# Patient Record
Sex: FEMALE | Race: ASIAN | NOT HISPANIC OR LATINO | Employment: FULL TIME | URBAN - METROPOLITAN AREA
[De-identification: names, ages, dates, MRNs, and addresses within clinical notes are randomized per-mention and may not be internally consistent; named-entity substitution may affect disease eponyms.]

---

## 2017-07-26 ENCOUNTER — TELEPHONE (OUTPATIENT)
Dept: PEDIATRICS | Facility: CLINIC | Age: 32
End: 2017-07-26

## 2017-07-26 NOTE — TELEPHONE ENCOUNTER
Please have patient and  come at least 20 minutes early as they are new patients and are sharing an . Please also have the  come 20 minutes early.

## 2017-07-27 ENCOUNTER — TELEPHONE (OUTPATIENT)
Dept: PEDIATRICS | Facility: CLINIC | Age: 32
End: 2017-07-27

## 2017-07-27 ENCOUNTER — OFFICE VISIT (OUTPATIENT)
Dept: PEDIATRICS | Facility: CLINIC | Age: 32
End: 2017-07-27
Payer: COMMERCIAL

## 2017-07-27 VITALS
BODY MASS INDEX: 29.38 KG/M2 | HEIGHT: 61 IN | OXYGEN SATURATION: 97 % | WEIGHT: 155.6 LBS | TEMPERATURE: 97.2 F | HEART RATE: 68 BPM | SYSTOLIC BLOOD PRESSURE: 104 MMHG | DIASTOLIC BLOOD PRESSURE: 64 MMHG

## 2017-07-27 DIAGNOSIS — R10.84 ABDOMINAL PAIN, GENERALIZED: ICD-10-CM

## 2017-07-27 DIAGNOSIS — Z78.9 INEFFECTIVE HEALTH MAINTENANCE: Primary | ICD-10-CM

## 2017-07-27 DIAGNOSIS — E28.2 PCOS (POLYCYSTIC OVARIAN SYNDROME): ICD-10-CM

## 2017-07-27 DIAGNOSIS — N97.9 FEMALE INFERTILITY: ICD-10-CM

## 2017-07-27 LAB
ALBUMIN UR-MCNC: NEGATIVE MG/DL
APPEARANCE UR: CLEAR
BETA HCG QUAL IFA URINE: NEGATIVE
BILIRUB UR QL STRIP: NEGATIVE
CHOLEST SERPL-MCNC: 198 MG/DL
COLOR UR AUTO: YELLOW
GLUCOSE SERPL-MCNC: 92 MG/DL (ref 70–99)
GLUCOSE UR STRIP-MCNC: NEGATIVE MG/DL
HDLC SERPL-MCNC: 32 MG/DL
HGB UR QL STRIP: NEGATIVE
KETONES UR STRIP-MCNC: NEGATIVE MG/DL
LDLC SERPL CALC-MCNC: 136 MG/DL
LEUKOCYTE ESTERASE UR QL STRIP: NEGATIVE
NITRATE UR QL: NEGATIVE
NONHDLC SERPL-MCNC: 166 MG/DL
PH UR STRIP: 6 PH (ref 5–7)
SP GR UR STRIP: 1.01 (ref 1–1.03)
TRIGL SERPL-MCNC: 149 MG/DL
TSH SERPL DL<=0.005 MIU/L-ACNC: 3.12 MU/L (ref 0.4–4)
URN SPEC COLLECT METH UR: NORMAL
UROBILINOGEN UR STRIP-ACNC: 0.2 EU/DL (ref 0.2–1)

## 2017-07-27 PROCEDURE — 84703 CHORIONIC GONADOTROPIN ASSAY: CPT | Performed by: NURSE PRACTITIONER

## 2017-07-27 PROCEDURE — 99385 PREV VISIT NEW AGE 18-39: CPT | Performed by: NURSE PRACTITIONER

## 2017-07-27 PROCEDURE — 36415 COLL VENOUS BLD VENIPUNCTURE: CPT | Performed by: NURSE PRACTITIONER

## 2017-07-27 PROCEDURE — 80061 LIPID PANEL: CPT | Performed by: NURSE PRACTITIONER

## 2017-07-27 PROCEDURE — 99213 OFFICE O/P EST LOW 20 MIN: CPT | Mod: 25 | Performed by: NURSE PRACTITIONER

## 2017-07-27 PROCEDURE — 84443 ASSAY THYROID STIM HORMONE: CPT | Performed by: NURSE PRACTITIONER

## 2017-07-27 PROCEDURE — 81003 URINALYSIS AUTO W/O SCOPE: CPT | Performed by: NURSE PRACTITIONER

## 2017-07-27 PROCEDURE — 82947 ASSAY GLUCOSE BLOOD QUANT: CPT | Performed by: NURSE PRACTITIONER

## 2017-07-27 RX ORDER — PRENATAL VIT/IRON FUM/FOLIC AC 27MG-0.8MG
1 TABLET ORAL DAILY
Qty: 100 TABLET | Refills: 3 | Status: SHIPPED | OUTPATIENT
Start: 2017-07-27 | End: 2023-02-13

## 2017-07-27 NOTE — TELEPHONE ENCOUNTER
Patient states that she needs a prescription for her prenatal vitamin in order for it to be covered by insurance.  Please send script to [facility] in [place] & call patient @ home with any questions.    Thanks,    Torri Major

## 2017-07-27 NOTE — MR AVS SNAPSHOT
After Visit Summary   7/27/2017    Isaias Thompson    MRN: [id number]           Patient Information     Date Of Birth          1985        Visit Information        Provider Department      7/27/2017 9:45 AM Edilma Pinzon APRN CNP; LANGUAGE [facility]        Today's Diagnoses     Ineffective health maintenance    -  1    Female infertility        Abdominal pain, generalized          Care Instructions    -Prenatal vitamin  -Try to lose 10 lbs  -See infertility specialist.  -See me in 1 month for pap smear    Preventive Health Recommendations  Female Ages 26 - 39  Yearly exam:   See your health care provider every year in order to    Review health changes.     Discuss preventive care.      Review your medicines if you your doctor has prescribed any.    Until age 30: Get a Pap test every three years (more often if you have had an abnormal result).    After age 30: Talk to your doctor about whether you should have a Pap test every 3 years or have a Pap test with HPV screening every 5 years.   You do not need a Pap test if your uterus was removed (hysterectomy) and you have not had cancer.  You should be tested each year for STDs (sexually transmitted diseases), if you're at risk.   Talk to your provider about how often to have your cholesterol checked.  If you are at risk for diabetes, you should have a diabetes test (fasting glucose).  Shots: Get a flu shot each year. Get a tetanus shot every 10 years.   Nutrition:     Eat at least 5 servings of fruits and vegetables each day.    Eat whole-grain bread, whole-wheat pasta and brown rice instead of white grains and rice.    Talk to your provider about Calcium and Vitamin D.     Lifestyle    Exercise at least 150 minutes a week (30 minutes a day, 5 days of the week). This will help you control your weight and prevent disease.    Limit alcohol to one drink per day.    No smoking.     Wear sunscreen to prevent skin cancer.    See  "your dentist every six months for an exam and cleaning.            Follow-ups after your visit        Additional Services     INFERTILITY/AI REFERRAL       Your provider has referred you to: [facility] [phone]   [email or web address]    Please be aware that coverage of these services is subject to the terms and limitations of your health insurance plan.  Call member services at your health plan with any benefit or coverage questions.      Please bring the following with you to your appointment:    (1) Any X-Rays, CTs or MRIs which have been performed.  Contact the facility where they were done to arrange for  prior to your scheduled appointment.    (2) List of current medications   (3) This referral request   (4) Any documents/labs given to you for this referral                  Who to contact     If you have questions or need follow up information about today's clinic visit or your schedule please contact [facility] directly at [phone].  Normal or non-critical lab and imaging results will be communicated to you by Vidmindhart, letter or phone within 4 business days after the clinic has received the results. If you do not hear from us within 7 days, please contact the clinic through [employer] or phone. If you have a critical or abnormal lab result, we will notify you by phone as soon as possible.  Submit refill requests through Plastio or call your pharmacy and they will forward the refill request to us. Please allow 3 business days for your refill to be completed.          Additional Information About Your Visit        Plastio Information     Plastio lets you send messages to your doctor, view your test results, renew your prescriptions, schedule appointments and more. To sign up, go to [email or web address] . Click on \"Log in\" on the left side of the screen, which will take you to the Welcome page. Then click on \"Sign up Now\" on the right side of the page. " "    You will be asked to enter the access code listed below, as well as some personal information. Please follow the directions to create your username and password.     Your access code is: [email or web address]  Expires: 10/25/2017 11:07 AM     Your access code will  in 90 days. If you need help or a new code, please call your [facility] clinic or [phone].        Care EveryWhere ID     This is your Care EveryWhere ID. This could be used by other organizations to access your [facility] medical records  [id number]        Your Vitals Were     Pulse Temperature Height Last Period Pulse Oximetry BMI (Body Mass Index)    68 97.2  F (36.2  C) (Tympanic) 5' 1\" (1.549 m) 2017 (Exact Date) 97% 29.4 kg/m2       Blood Pressure from Last 3 Encounters:   17 104/64    Weight from Last 3 Encounters:   17 155 lb 9.6 oz (70.6 kg)              We Performed the Following     *UA reflex to Microscopic and Culture ([place] [facility] (except [place] and [place])     Beta HCG qual IFA urine     Glucose     INFERTILITY/AI REFERRAL     Lipid panel reflex to direct LDL     TSH with free T4 reflex        Primary Care Provider Office Phone # Fax #    VARGAS Wheeler [facility] [phone]       [facility] [address] [place] [address] [address] [place] [address]        Equal Access to Services     [facility] HospitalJACQUE AH: Hadii aad ku hadasho Soomaali, waaxda luqadaha, qaybta kaalmada adeegyada, tracy vela . So [facility] [phone].    ATENCIÓN: Si habla español, tiene a dai disposición servicios gratuitos de asistencia lingüística. Cathi al [phone].    We comply with applicable federal civil rights laws and [place] laws. We do not discriminate on the basis of race, color, national origin, age, disability sex, sexual orientation or gender identity.            Thank you!     Thank you for choosing [facility]  for your care. Our goal is always to provide you with " excellent care. Hearing back from our patients is one way we can continue to improve our services. Please take a few minutes to complete the written survey that you may receive in the mail after your visit with us. Thank you!             Your Updated Medication List - Protect others around you: Learn how to safely use, store and throw away your medicines at www.disposemymeds.org.      Notice  As of 7/27/2017 11:10 AM    You have not been prescribed any medications.

## 2017-07-27 NOTE — PROGRESS NOTES
SUBJECTIVE:   CC: Isaias Thompson is an 32 year old woman who presents for preventive health visit.     Physical   Annual:     Getting at least 3 servings of Calcium per day::  Yes    Bi-annual eye exam::  NO    Dental care twice a year::  NO    Sleep apnea or symptoms of sleep apnea::  None    Diet::  Regular (no restrictions)    Frequency of exercise::  None    Taking medications regularly::  Yes    Medication side effects::  None    Additional concerns today::  YES  Annual comment:  Irregular periods          PROBLEMS TO ADD ON...Has PCOS. Gets periods once every few months. Has been trying to get pregnant for 18 months.     Today's PHQ-2 Score: PHQ-2 ( 1999 Pfizer) 7/27/2017   Q1: Little interest or pleasure in doing things 0   Q2: Feeling down, depressed or hopeless 0   PHQ-2 Score 0   Q1: Little interest or pleasure in doing things Not at all   Q2: Feeling down, depressed or hopeless Not at all   PHQ-2 Score 0       Abuse: Current or Past(Physical, Sexual or Emotional)- No  Do you feel safe in your environment - Yes    Social History   Substance Use Topics     Smoking status: Never Smoker     Smokeless tobacco: Never Used     Alcohol use No     The patient does not drink >3 drinks per day nor >7 drinks per week.    Reviewed orders with patient.  Reviewed health maintenance and updated orders accordingly - Yes  Labs reviewed in EPIC    Mammogram not appropriate for this patient based on age.    Pertinent mammograms are reviewed under the imaging tab.  History of abnormal Pap smear: NO - age 30-65 PAP every 5 years with negative HPV co-testing recommended    Reviewed and updated as needed this visit by clinical staff  Tobacco  Allergies  Meds  Med Hx  Surg Hx  Fam Hx  Soc Hx        Reviewed and updated as needed this visit by Provider              ROS:  C: NEGATIVE for fever, chills, change in weight  I: NEGATIVE for worrisome rashes, moles or lesions  E: NEGATIVE for vision changes or irritation  ENT:  "NEGATIVE for ear, mouth and throat problems  R: NEGATIVE for significant cough or SOB  B: NEGATIVE for masses, tenderness or discharge  CV: NEGATIVE for chest pain, palpitations or peripheral edema  GI: NEGATIVE for nausea, abdominal pain, heartburn, or change in bowel habits   female: irregular periods  M: NEGATIVE for significant arthralgias or myalgia  N: NEGATIVE for weakness, dizziness or paresthesias  P: NEGATIVE for changes in mood or affect     OBJECTIVE:   /64 (Cuff Size: Adult Regular)  Pulse 68  Temp 97.2  F (36.2  C) (Tympanic)  Ht 5' 1\" (1.549 m)  Wt 155 lb 9.6 oz (70.6 kg)  LMP 05/22/2017 (Exact Date)  SpO2 97%  BMI 29.4 kg/m2  EXAM:  GENERAL: healthy, alert and no distress  EYES: Eyes grossly normal to inspection, PERRL and conjunctivae and sclerae normal  HENT: ear canals and TM's normal, nose and mouth without ulcers or lesions  NECK: no adenopathy, no asymmetry, masses, or scars and thyroid normal to palpation  RESP: lungs clear to auscultation - no rales, rhonchi or wheezes  CV: regular rate and rhythm, normal S1 S2, no S3 or S4, no murmur, click or rub, no peripheral edema and peripheral pulses strong  ABDOMEN: soft, nontender, no hepatosplenomegaly, no masses and bowel sounds normal  MS: no gross musculoskeletal defects noted, no edema  SKIN: no suspicious lesions or rashes  NEURO: Normal strength and tone, mentation intact and speech normal  PSYCH: mentation appears normal, affect normal/bright    ASSESSMENT/PLAN:   1. Ineffective health maintenance  - Lipid panel reflex to direct LDL  - Glucose  -Declines pap and breast exam today. Explained exams to patient. She agrees to RTC in 1 month for pap.     2. Female infertility  Likely related to PCOS. Will check into other causes and refer to infertility as is has been over 1 year of trying.   - INFERTILITY/AI REFERRAL  - TSH with free T4 reflex  - Beta HCG qual IFA urine  -Recommended losing weight to improve menstrual regularity. " "  -start prenatal vitamin in case of pregnancy    3. Abdominal pain, generalized  Mild SP tenderness on exam. No dysuria, vag DC, etc.   - *UA reflex to Microscopic and Culture ([place] [facility] (except [place] and [place])    COUNSELING:  Reviewed preventive health counseling, as reflected in patient instructions     reports that she has never smoked. She has never used smokeless tobacco.    Estimated body mass index is 29.4 kg/(m^2) as calculated from the following:    Height as of this encounter: 5' 1\" (1.549 m).    Weight as of this encounter: 155 lb 9.6 oz (70.6 kg).   Weight management plan: Discussed healthy diet and exercise guidelines and patient will follow up in 12 months in clinic to re-evaluate.    Counseling Resources:  ATP IV Guidelines  Pooled Cohorts Equation Calculator  Breast Cancer Risk Calculator  FRAX Risk Assessment  ICSI Preventive Guidelines  Dietary Guidelines for Americans, 2010  USDA's MyPlate  ASA Prophylaxis  Lung CA Screening    Edilma Pinzon, VARGAS [facility] [place]  "

## 2017-07-27 NOTE — NURSING NOTE
"Chief Complaint   Patient presents with     Physical       Initial /64 (Cuff Size: Adult Regular)  Pulse 68  Temp 97.2  F (36.2  C) (Tympanic)  Ht 5' 1\" (1.549 m)  Wt 155 lb 9.6 oz (70.6 kg)  LMP 05/22/2017 (Exact Date)  SpO2 97%  BMI 29.4 kg/m2 Estimated body mass index is 29.4 kg/(m^2) as calculated from the following:    Height as of this encounter: 5' 1\" (1.549 m).    Weight as of this encounter: 155 lb 9.6 oz (70.6 kg).  Medication Reconciliation: complete   Wendie Isaacs CMA    "

## 2017-07-27 NOTE — PATIENT INSTRUCTIONS
-Prenatal vitamin  -Try to lose 10 lbs  -See infertility specialist.  -See me in 1 month for pap smear    Preventive Health Recommendations  Female Ages 26 - 39  Yearly exam:   See your health care provider every year in order to    Review health changes.     Discuss preventive care.      Review your medicines if you your doctor has prescribed any.    Until age 30: Get a Pap test every three years (more often if you have had an abnormal result).    After age 30: Talk to your doctor about whether you should have a Pap test every 3 years or have a Pap test with HPV screening every 5 years.   You do not need a Pap test if your uterus was removed (hysterectomy) and you have not had cancer.  You should be tested each year for STDs (sexually transmitted diseases), if you're at risk.   Talk to your provider about how often to have your cholesterol checked.  If you are at risk for diabetes, you should have a diabetes test (fasting glucose).  Shots: Get a flu shot each year. Get a tetanus shot every 10 years.   Nutrition:     Eat at least 5 servings of fruits and vegetables each day.    Eat whole-grain bread, whole-wheat pasta and brown rice instead of white grains and rice.    Talk to your provider about Calcium and Vitamin D.     Lifestyle    Exercise at least 150 minutes a week (30 minutes a day, 5 days of the week). This will help you control your weight and prevent disease.    Limit alcohol to one drink per day.    No smoking.     Wear sunscreen to prevent skin cancer.    See your dentist every six months for an exam and cleaning.

## 2017-11-16 ENCOUNTER — MYC MEDICAL ADVICE (OUTPATIENT)
Dept: PEDIATRICS | Facility: CLINIC | Age: 32
End: 2017-11-16

## 2017-11-17 ENCOUNTER — MYC MEDICAL ADVICE (OUTPATIENT)
Dept: PEDIATRICS | Facility: CLINIC | Age: 32
End: 2017-11-17

## 2017-11-17 NOTE — TELEPHONE ENCOUNTER
Started an encounter & sent it to pcp to address through pt's chart. LM for pt to call us back. Please see spouse's chart for detail([id number]). Thanks.     Humberto, RN  Triage Nurse

## 2017-11-20 NOTE — TELEPHONE ENCOUNTER
Routing to MA pool to address ANN/Records below. Thank you.     Rylie Mcpherson RN -- [employer]

## 2018-01-21 ENCOUNTER — HEALTH MAINTENANCE LETTER (OUTPATIENT)
Age: 33
End: 2018-01-21

## 2018-01-22 ENCOUNTER — TELEPHONE (OUTPATIENT)
Dept: PEDIATRICS | Facility: CLINIC | Age: 33
End: 2018-01-22

## 2018-01-22 NOTE — LETTER
January 29, 2018      Isaias Thompson  [address]  [place] [address]        Dear Isaias,       We care about your health and have reviewed your health plan including your medical conditions, medications, and lab results.  Based on this review, it is recommended that you follow up regarding the following health topic(s):  -Cervical Cancer Screening    We recommend you take the following action(s):  -schedule a PAP SMEAR EXAM which is due.  Please disregard this reminder if you have had this exam elsewhere within the last year.  It would be helpful for us to have a copy of your recent pap smear report to update your records.   You did not have this done at your last physical in July 2017 and were going to return to have it done.     Please call us at the [facility] - [phone] (or use Nuji) to address the above recommendations.     Thank you for trusting [facility] and we appreciate the opportunity to serve you.  We look forward to supporting your healthcare needs in the future.    Healthy Regards,    Your Health Care Team  [facility]      Sincerely,    VARGAS Samson CNP

## 2018-01-22 NOTE — TELEPHONE ENCOUNTER
Panel Management Review      Patient has the following on her problem list: None      Composite cancer screening  Chart review shows that this patient is due/due soon for the following Pap Smear  Summary:    Patient is due/failing the following:   PAP    Action needed:   Patient needs office visit for pap.    Type of outreach:    Sent [employer] message.    Questions for provider review:    None                                                                                                                                  Wendie Isaacs CMA    Chart routed to Care Team .

## 2018-04-03 ENCOUNTER — OFFICE VISIT (OUTPATIENT)
Dept: ENDOCRINOLOGY | Facility: CLINIC | Age: 33
End: 2018-04-03
Payer: COMMERCIAL

## 2018-04-03 VITALS
WEIGHT: 161.9 LBS | SYSTOLIC BLOOD PRESSURE: 106 MMHG | BODY MASS INDEX: 30.57 KG/M2 | RESPIRATION RATE: 14 BRPM | OXYGEN SATURATION: 100 % | HEIGHT: 61 IN | HEART RATE: 84 BPM | DIASTOLIC BLOOD PRESSURE: 62 MMHG

## 2018-04-03 DIAGNOSIS — E28.2 PCOS (POLYCYSTIC OVARIAN SYNDROME): Primary | ICD-10-CM

## 2018-04-03 DIAGNOSIS — N97.9 FEMALE INFERTILITY: ICD-10-CM

## 2018-04-03 PROCEDURE — 99204 OFFICE O/P NEW MOD 45 MIN: CPT | Performed by: INTERNAL MEDICINE

## 2018-04-03 NOTE — PATIENT INSTRUCTIONS
[facility]   Dr Sam, Endocrinology Department      [facility]   [address] [address]  Appointment [phone]  Fax: [phone]   Monday and Tuesday         [place] [address] [place] [address]  Appointment [phone]  Fax: [phone]  Wednesday and Thursday           Follow up with reproductive endocrinology.

## 2018-04-03 NOTE — PROGRESS NOTES
Name: Isaias Thompson  Self referred for infertility.  Chief Complaint   Patient presents with     Consult     fertility questions      HPI:  Isaias Thompson is a 33 year old female who presents for the evaluation of  Infertility.  Was seen by Dr.Hertz Anita Sexton at [facility] 11/2017.  She has h/o PCOS, diagnosed in [place] in 2010.  Her  was diagnosed with low sperm count on semen analysis many years back.  During her visit with OB/GYN FSH, LH (with irregular cyclaes), normal prolactin levels.  Normal estradiol, progesterone and slightly high testosterone (h/o PCOS)  Normal 17 OH progesterone.  TSH was normal 7/2017  Currently she is taking only prenatal vitamins.    Has irregular periods.  Menarache- at age 15 years.  Never been on birth control pills.  Now has irregular periods.  She was asked to follow-up with reproductive endocrinology but wanted to check with me to make sure PCO is treated and thyroid labs are in normal range.    In general she is feeling well.  No major complaints today.  She denies chest pain, palpitations, diarrhea, constipation.      PMH/PSH:  Past Medical History:   Diagnosis Date     NO ACTIVE PROBLEMS      PCOS (polycystic ovarian syndrome)      Past Surgical History:   Procedure Laterality Date     NO HISTORY OF SURGERY       Family Hx:  Family History   Problem Relation Age of Onset     Other Cancer Mother      ovarian     DIABETES Mother      Hypertension Mother      Social Hx:  Social History     Social History     Marital status:      Spouse name: N/A     Number of children: N/A     Years of education: N/A     Occupational History     Not on file.     Social History Main Topics     Smoking status: Never Smoker     Smokeless tobacco: Never Used     Alcohol use No     Drug use: No     Sexual activity: Yes     Partners: Male     Other Topics Concern     Not on file     Social History Narrative          MEDICATIONS:  has a current medication list which includes the following  "prescription(s): prenatal multivitamin plus iron.    ROS     ROS: 10 point ROS neg other than the symptoms noted above in the HPI.    Physical Exam   VS: /62 (BP Location: Right arm, Patient Position: Chair, Cuff Size: Adult Regular)  Pulse 84  Resp 14  Ht 1.549 m (5' 1\")  Wt 73.4 kg (161 lb 14.4 oz)  SpO2 100%  BMI 30.59 kg/m2  GENERAL: AXOX3, NAD, well dressed, answering questions appropriately, appears stated age.  HEENT: OP clear, no LAD, no TM, non-tender, no exopthalmous, no proptosis, EOMI, no lig lag, no retraction  NECK: Thyroid normal in size, non tender, no nodules were palpated.  CV: RRR, no rubs, gallops, no murmurs  LUNGS: CTAB, no wheezes, rales, or ronchi  ABDOMEN: +BS  EXTREMITIES: no edema, +pulses, no rashes, no lesions  NEUROLOGY: CN grossly intact, + DTR upper and lower extremity, no tremors  MSK: grossly intact  SKIN: no rashes, no lesions  PSYCH: normal affect and mood    LABS:    TSH   Date Value Ref Range Status   07/27/2017 3.12 0.40 - 4.00 mU/L Final     Labs done at outside clinic personally reviewed through care everywhere.    Pelvic US:  Gynecologic Ultrasound    Date of exam: 11/15/2017  Indication for exam: infertility   Requesting Provider: Wendie Lew DO    TECHNIQUE:   Transabdominal scan was performed. Transvaginal scan was performed for   improved visualization of the pelvic structures.    FINDINGS:   The uterus is present, anteverted, without deviation, and measures 6.7 x   3.1 x 4.3 cm.  The myometrium is homogenous.  There is no evidence of intrauterine masses.  The endometrial thickness is 5.7 mm.  There are no myomas noted.  The right ovary is identified and measures 3.9 x 2.1 x 2.3 cm and appears   polycystic.   The left ovary is identified and measures 4.1 x 2 x 2.1 cm and appears   polycystic.   Free fluid in the cul de sac: none    All pertinent notes, labs, and images personally reviewed by me.   All pertinent notes, labs and reports done at outside " clinic personally reviewed by me through care everywhere.    A/P  Ms.Elavarasi Thompson is a 33 year old here for the evaluation of:  1.  PCOS:  I discussed PCAs in general.  Has slightly elevated testosterone levels as well as pelvic ultrasound consistent with polycystic ovarian syndrome.  Primary treatment option is oral contraceptive pills.  At this time she is planning pregnancy.  I discussed metformin in general.  But as she is planning pregnancy would like to hold off at this time.  Recommend lifestyle change and weight loss  Follow-up with endocrinology.    2.  Infertility:  As discussed above workup done so far has been in acceptable range  Encouraged her to follow-up with reproductive endocrinology.      More than 50% of face to face time spent with Ms. Thompson on counseling / coordinating her care.  Total appointment times was 45 minutes.      All questions were answered.  The patient indicates understanding of the above issues and agrees with the plan set forth.       Follow-up:  follow up as needed    Jocelynn Sam MD  Endocrinology   [facility]/[place]    CC: Edilma Pinzon     Disclaimer: This note consists of symbols derived from keyboarding, dictation and/or voice recognition software. As a result, there may be errors in the script that have gone undetected. Please consider this when interpreting information found in this chart.    Addendum to above note and clinic visit:    Labs reviewed.    See result note/telephone encounter.

## 2018-04-03 NOTE — MR AVS SNAPSHOT
After Visit Summary   4/3/2018    Isaias Thompson    MRN: [id number]           Patient Information     Date Of Birth          1985        Visit Information        Provider Department      4/3/2018 9:15 AM Jocelynn Sam MD; PHONE,  [facility]        Care Instructions    [facility]   Dr Sam, Endocrinology Department      [address] [address]  Appointment [phone]  Fax: [phone]   Monday and Tuesday         [place] [address] [place] [address]  Appointment [phone]  Fax: [phone]  Wednesday and Thursday           Follow up with reproductive endocrinology.          Follow-ups after your visit        Your next 10 appointments already scheduled     2018  4:00 PM CDT   Office Visit with VARGAS Wheeler CNP   [facility]    [address] [place] [address]  [address]  [facility] [address] [phone]           Bring a current list of meds and any records pertaining to this visit. For Physicals, please bring immunization records and any forms needing to be filled out. Please arrive 10 minutes early to complete paperwork.            2018  3:00 PM CDT   New Visit with Jocelynn Sam MD   [facility] ([facility]    [address] [place] [address]  [address]  [facility] [address] [phone]              Who to contact     If you have questions or need follow up information about today's clinic visit or your schedule please contact [facility] directly at [phone].  Normal or non-critical lab and imaging results will be communicated to you by MyChart, letter or phone within 4 business days after the clinic has received the results. If you do not hear from us within 7 days, please contact the  "clinic through CYBRAhart or phone. If you have a critical or abnormal lab result, we will notify you by phone as soon as possible.  Submit refill requests through Gauzy or call your pharmacy and they will forward the refill request to us. Please allow 3 business days for your refill to be completed.          Additional Information About Your Visit        CYBRAhart Information     Gauzy gives you secure access to your electronic health record. If you see a primary care provider, you can also send messages to your care team and make appointments. If you have questions, please call your primary care clinic.  If you do not have a primary care provider, please call [phone] and they will assist you.        Care EveryWhere ID     This is your Care EveryWhere ID. This could be used by other organizations to access your [facility] medical records  [id number]        Your Vitals Were     Pulse Respirations Height Pulse Oximetry BMI (Body Mass Index)       84 14 1.549 m (5' 1\") 100% 30.59 kg/m2        Blood Pressure from Last 3 Encounters:   04/03/18 106/62   07/27/17 104/64    Weight from Last 3 Encounters:   04/03/18 73.4 kg (161 lb 14.4 oz)   07/27/17 70.6 kg (155 lb 9.6 oz)              Today, you had the following     No orders found for display       Primary Care Provider Office Phone # Fax #    VARGAS Wheeler [facility] [phone] [address] [place] [address]        Equal Access to Services     [facility]: Hadii ruslan ku hadstephanieo Somadhavali, waaxda luqadaha, qaybta kaalmada adeegyada, tracy gabriel. So [facility] [phone].    ATENCIÓN: Si habla español, tiene a dai disposición servicios gratuitos de asistencia lingüística. Cathi al [phone].    We comply with applicable federal civil rights laws and [place] laws. We do not discriminate on the basis of race, color, national origin, age, disability, sex, sexual orientation, or gender " identity.            Thank you!     Thank you for choosing [facility]  for your care. Our goal is always to provide you with excellent care. Hearing back from our patients is one way we can continue to improve our services. Please take a few minutes to complete the written survey that you may receive in the mail after your visit with us. Thank you!             Your Updated Medication List - Protect others around you: Learn how to safely use, store and throw away your medicines at www.disposemymeds.org.          This list is accurate as of 4/3/18 10:11 AM.  Always use your most recent med list.                   Brand Name Dispense Instructions for use Diagnosis    prenatal multivitamin plus iron 27-0.8 MG Tabs per tablet     100 tablet    Take 1 tablet by mouth daily    Female infertility

## 2018-04-03 NOTE — LETTER
4/3/2018         RE: Isaias Thompson  [address]  [facility] [address]        Dear Colleague,    Thank you for referring your patient, Isaias Thompson, to the [facility]. Please see a copy of my visit note below.    Name: Isaias Thompson  Self referred for infertility.  Chief Complaint   Patient presents with     Consult     fertility questions      HPI:  Isaias Thompson is a 33 year old female who presents for the evaluation of  Infertility.  Was seen by Dr.Hertz Anita Sexton at [facility] 11/2017.  She has h/o PCOS, diagnosed in [place] in 2010.  Her  was diagnosed with low sperm count on semen analysis many years back.  During her visit with OB/GYN FSH, LH (with irregular cyclaes), normal prolactin levels.  Normal estradiol, progesterone and slightly high testosterone (h/o PCOS)  Normal 17 OH progesterone.  TSH was normal 7/2017  Currently she is taking only prenatal vitamins.    Has irregular periods.  Menarache- at age 15 years.  Never been on birth control pills.  Now has irregular periods.  She was asked to follow-up with reproductive endocrinology but wanted to check with me to make sure PCO is treated and thyroid labs are in normal range.    In general she is feeling well.  No major complaints today.  She denies chest pain, palpitations, diarrhea, constipation.      PMH/PSH:  Past Medical History:   Diagnosis Date     NO ACTIVE PROBLEMS      PCOS (polycystic ovarian syndrome)      Past Surgical History:   Procedure Laterality Date     NO HISTORY OF SURGERY       Family Hx:  Family History   Problem Relation Age of Onset     Other Cancer Mother      ovarian     DIABETES Mother      Hypertension Mother      Social Hx:  Social History     Social History     Marital status:      Spouse name: N/A     Number of children: N/A     Years of education: N/A     Occupational History     Not on file.     Social History Main Topics     Smoking status: Never Smoker     Smokeless tobacco: Never  "Used     Alcohol use No     Drug use: No     Sexual activity: Yes     Partners: Male     Other Topics Concern     Not on file     Social History Narrative          MEDICATIONS:  has a current medication list which includes the following prescription(s): prenatal multivitamin plus iron.    ROS     ROS: 10 point ROS neg other than the symptoms noted above in the HPI.    Physical Exam   VS: /62 (BP Location: Right arm, Patient Position: Chair, Cuff Size: Adult Regular)  Pulse 84  Resp 14  Ht 1.549 m (5' 1\")  Wt 73.4 kg (161 lb 14.4 oz)  SpO2 100%  BMI 30.59 kg/m2  GENERAL: AXOX3, NAD, well dressed, answering questions appropriately, appears stated age.  HEENT: OP clear, no LAD, no TM, non-tender, no exopthalmous, no proptosis, EOMI, no lig lag, no retraction  NECK: Thyroid normal in size, non tender, no nodules were palpated.  CV: RRR, no rubs, gallops, no murmurs  LUNGS: CTAB, no wheezes, rales, or ronchi  ABDOMEN: +BS  EXTREMITIES: no edema, +pulses, no rashes, no lesions  NEUROLOGY: CN grossly intact, + DTR upper and lower extremity, no tremors  MSK: grossly intact  SKIN: no rashes, no lesions  PSYCH: normal affect and mood    LABS:    TSH   Date Value Ref Range Status   07/27/2017 3.12 0.40 - 4.00 mU/L Final     Labs done at outside clinic personally reviewed through care everywhere.    Pelvic US:  Gynecologic Ultrasound    Date of exam: 11/15/2017  Indication for exam: infertility   Requesting Provider: Wendie Lew DO    TECHNIQUE:   Transabdominal scan was performed. Transvaginal scan was performed for   improved visualization of the pelvic structures.    FINDINGS:   The uterus is present, anteverted, without deviation, and measures 6.7 x   3.1 x 4.3 cm.  The myometrium is homogenous.  There is no evidence of intrauterine masses.  The endometrial thickness is 5.7 mm.  There are no myomas noted.  The right ovary is identified and measures 3.9 x 2.1 x 2.3 cm and appears   polycystic.   The left " ovary is identified and measures 4.1 x 2 x 2.1 cm and appears   polycystic.   Free fluid in the cul de sac: none    All pertinent notes, labs, and images personally reviewed by me.   All pertinent notes, labs and reports done at outside clinic personally reviewed by me through care everywhere.    A/P  Ms.Elavarasi Thompson is a 33 year old here for the evaluation of:  1.  PCOS:  I discussed PCAs in general.  Has slightly elevated testosterone levels as well as pelvic ultrasound consistent with polycystic ovarian syndrome.  Primary treatment option is oral contraceptive pills.  At this time she is planning pregnancy.  I discussed metformin in general.  But as she is planning pregnancy would like to hold off at this time.  Recommend lifestyle change and weight loss  Follow-up with endocrinology.    2.  Infertility:  As discussed above workup done so far has been in acceptable range  Encouraged her to follow-up with reproductive endocrinology.      More than 50% of face to face time spent with Ms. Thompson on counseling / coordinating her care.  Total appointment times was 45 minutes.      All questions were answered.  The patient indicates understanding of the above issues and agrees with the plan set forth.       Follow-up:  follow up as needed    Jocelynn Sam MD  Endocrinology   [facility]    CC: Edilma Pinzon     Disclaimer: This note consists of symbols derived from keyboarding, dictation and/or voice recognition software. As a result, there may be errors in the script that have gone undetected. Please consider this when interpreting information found in this chart.    Addendum to above note and clinic visit:    Labs reviewed.    See result note/telephone encounter.            Again, thank you for allowing me to participate in the care of your patient.        Sincerely,        Jocelynn Sam MD

## 2018-04-05 ENCOUNTER — OFFICE VISIT (OUTPATIENT)
Dept: PEDIATRICS | Facility: CLINIC | Age: 33
End: 2018-04-05
Payer: COMMERCIAL

## 2018-04-05 VITALS
DIASTOLIC BLOOD PRESSURE: 58 MMHG | HEIGHT: 61 IN | OXYGEN SATURATION: 99 % | WEIGHT: 161.2 LBS | TEMPERATURE: 97.5 F | HEART RATE: 84 BPM | SYSTOLIC BLOOD PRESSURE: 102 MMHG | BODY MASS INDEX: 30.43 KG/M2

## 2018-04-05 DIAGNOSIS — N91.2 LACK OF MENSES: Primary | ICD-10-CM

## 2018-04-05 DIAGNOSIS — Z00.00 HEALTHCARE MAINTENANCE: ICD-10-CM

## 2018-04-05 DIAGNOSIS — N97.9 FEMALE INFERTILITY: ICD-10-CM

## 2018-04-05 LAB — BETA HCG QUAL IFA URINE: NEGATIVE

## 2018-04-05 PROCEDURE — 99214 OFFICE O/P EST MOD 30 MIN: CPT | Performed by: NURSE PRACTITIONER

## 2018-04-05 PROCEDURE — G0145 SCR C/V CYTO,THINLAYER,RESCR: HCPCS | Performed by: NURSE PRACTITIONER

## 2018-04-05 PROCEDURE — 84703 CHORIONIC GONADOTROPIN ASSAY: CPT | Performed by: NURSE PRACTITIONER

## 2018-04-05 PROCEDURE — 87624 HPV HI-RISK TYP POOLED RSLT: CPT | Performed by: NURSE PRACTITIONER

## 2018-04-05 NOTE — MR AVS SNAPSHOT
After Visit Summary   4/5/2018    Isaias Thompson    MRN: [id number]           Patient Information     Date Of Birth          1985        Visit Information        Provider Department      4/5/2018 4:00 PM Edilma Pinzon APRN CNP; PHONE,  [facility]        Today's Diagnoses     Lack of menses    -  1    Female infertility        Healthcare maintenance           Follow-ups after your visit        Additional Services     INFERTILITY/AI REFERRAL       Your provider has referred you to: [facility]: [facility] [phone]   [email or web address]    [facility]: [facility] [phone]   [email or web address]    [facility]: [facility] [phone]   [email or web address]    [facility] ([facility]) [facility] [phone]   [email or web address]    Please be aware that coverage of these services is subject to the terms and limitations of your health insurance plan.  Call member services at your health plan with any benefit or coverage questions.      Please bring the following with you to your appointment:    (1) Any X-Rays, CTs or MRIs which have been performed.  Contact the facility where they were done to arrange for  prior to your scheduled appointment.    (2) List of current medications   (3) This referral request   (4) Any documents/labs given to you for this referral                  Your next 10 appointments already scheduled     Apr 23, 2018  3:00 PM CDT   New Visit with Jocelynn Sam MD   [facility]    [address]  [address]  [place] [address] [phone]              Who to contact     If you have questions or need follow up information about today's clinic visit or your schedule please contact [facility] directly at [phone].  Normal or non-critical lab and imaging results will be communicated to you by MyChart, letter or phone within 4 business days after the clinic has received the results. If you do not hear from us within 7 days, please contact the clinic through [place] Translatehart or phone. If you have a critical or abnormal lab result, we will notify you by phone as soon as possible.  Submit refill requests through Fingooroo or call your pharmacy and they will forward the refill request to us. Please allow 3 business days for your refill to be completed.          Additional Information About Your Visit        Fingooroo Information     Fingooroo gives you secure access to your electronic health record. If you see a primary care provider, you can also send messages to your care team and make appointments. If you have questions, please call your primary care clinic.  If you do not have a primary care provider, please call [phone] and they will assist you.        Care EveryWhere ID     This is your Care EveryWhere ID. This could be used by other organizations to access your [facility] medical records  [id number]        Your Vitals Were     Pulse Temperature Height Last Period Pulse Oximetry BMI (Body Mass Index)    84 97.5  F (36.4  C) (Tympanic) 5' 1\" (1.549 m) 01/29/2018 (Exact Date) 99% 30.46 kg/m2       Blood Pressure from Last 3 Encounters:   04/05/18 102/58   04/03/18 106/62   07/27/17 104/64    Weight from Last 3 Encounters:   04/05/18 161 lb 3.2 oz (73.1 kg)   04/03/18 161 lb 14.4 oz (73.4 kg)   07/27/17 155 lb 9.6 oz (70.6 kg)              We Performed the Following     Beta HCG Qual, Urine - [facility] [place] ([id number])     HPV High Risk Types DNA Cervical     INFERTILITY/AI REFERRAL     Pap imaged thin layer screen with HPV - recommended age 30 - 65 years (select HPV order below)        Primary Care Provider Office Phone # Fax #    VARGAS Wheeler [phone]       " [address] [place] [address]        Equal Access to Services     [facility] ALFONSO : Hadii ruslan Dennis, jayy mcallister, melissa roblero, tracy gabriel. So [facility] [phone].    ATENCIÓN: Si habla español, tiene a dai disposición servicios gratuitos de asistencia lingüística. Llame al [phone].    We comply with applicable federal civil rights laws and [place] laws. We do not discriminate on the basis of race, color, national origin, age, disability, sex, sexual orientation, or gender identity.            Thank you!     Thank you for choosing [facility]  for your care. Our goal is always to provide you with excellent care. Hearing back from our patients is one way we can continue to improve our services. Please take a few minutes to complete the written survey that you may receive in the mail after your visit with us. Thank you!             Your Updated Medication List - Protect others around you: Learn how to safely use, store and throw away your medicines at www.disposemymeds.org.          This list is accurate as of 4/5/18 11:59 PM.  Always use your most recent med list.                   Brand Name Dispense Instructions for use Diagnosis    prenatal multivitamin plus iron 27-0.8 MG Tabs per tablet     100 tablet    Take 1 tablet by mouth daily    Female infertility

## 2018-04-05 NOTE — PROGRESS NOTES
"  SUBJECTIVE:   Isaisa Thompson is a 33 year old female who presents to clinic today for the following health issues:    Patient here today for pap smear.    Has had trouble getting an appointment with a fertility specialist.  Many not covered. Has very irregular periods. Has already done a few female tests and semen analysis. Hasn't had anyone interpret semen analysis testing.    ROS: const/gyn otherwise negative     OBJECTIVE:  /58 (Cuff Size: Adult Large)  Pulse 84  Temp 97.5  F (36.4  C) (Tympanic)  Ht 5' 1\" (1.549 m)  Wt 161 lb 3.2 oz (73.1 kg)  LMP 01/29/2018 (Exact Date)  SpO2 99%  BMI 30.46 kg/m2  CONSTITUTIONAL: Alert, well-nourished, well-groomed, NAD  RESP: Lungs CTA. No wheeze, rhonchi, rales.  CV: HRRR S1 S2 No MRG. No peripheral edema   (female): normal female external genitalia, vaginal mucosa pink, moist, well rugated and normal cervix, adnexae, and uterus without masses. Normal vaginal discharge      ASSESSMENT/PLAN:  (N91.2) Lack of menses  (primary encounter diagnosis)  Comment: UPT negative. Patient with baseline irregular periods.   Plan: Beta HCG Qual, Urine - [facility] and [place]         ([id number])            (N97.9) Female infertility  Plan: INFERTILITY/AI REFERRAL        Spent at least 10 minutes discussing how to call these locations to get names of infertility providers. Then she needs to call her insurance to find out which ones are covered. Again had to discuss that I cannot interpret semen analyses and that the infertility provider would be the one to do this.    (Z00.00) Healthcare maintenance  Plan: Pap imaged thin layer screen with HPV -         recommended age 30 - 65 years (select HPV order        below), HPV High Risk Types DNA Cervical              Edilma [place], FNP-DNP.        "

## 2018-04-10 LAB
COPATH REPORT: NORMAL
PAP: NORMAL

## 2018-04-13 LAB
FINAL DIAGNOSIS: NORMAL
HPV HR 12 DNA CVX QL NAA+PROBE: NEGATIVE
HPV16 DNA SPEC QL NAA+PROBE: NEGATIVE
HPV18 DNA SPEC QL NAA+PROBE: NEGATIVE
SPECIMEN DESCRIPTION: NORMAL
SPECIMEN SOURCE CVX/VAG CYTO: NORMAL

## 2018-04-19 ENCOUNTER — OFFICE VISIT (OUTPATIENT)
Dept: PEDIATRICS | Facility: CLINIC | Age: 33
End: 2018-04-19
Payer: COMMERCIAL

## 2018-04-19 VITALS
TEMPERATURE: 97.7 F | SYSTOLIC BLOOD PRESSURE: 118 MMHG | HEART RATE: 76 BPM | HEIGHT: 61 IN | DIASTOLIC BLOOD PRESSURE: 84 MMHG | BODY MASS INDEX: 30.81 KG/M2 | WEIGHT: 163.2 LBS

## 2018-04-19 DIAGNOSIS — S46.312A SPRAIN, TRICEP, LEFT, INITIAL ENCOUNTER: Primary | ICD-10-CM

## 2018-04-19 DIAGNOSIS — S29.012A STRAIN OF LATISSIMUS DORSI MUSCLE, INITIAL ENCOUNTER: ICD-10-CM

## 2018-04-19 PROCEDURE — 99213 OFFICE O/P EST LOW 20 MIN: CPT | Mod: GC | Performed by: STUDENT IN AN ORGANIZED HEALTH CARE EDUCATION/TRAINING PROGRAM

## 2018-04-19 NOTE — MR AVS SNAPSHOT
After Visit Summary   4/19/2018    Isaias Thompson    MRN: [id number]           Patient Information     Date Of Birth          1985        Visit Information        Provider Department      4/19/2018 3:45 PM Dipak Ling MD; PHONE,  [facility]        Today's Diagnoses     Sprain, tricep, left, initial encounter    -  1    Strain of latissimus dorsi muscle, initial encounter          Care Instructions      Muscle Strain in the Extremities  A muscle strain is a stretching and tearing of muscle fibers. This causes pain, especially when you move that muscle. There may also be some swelling and bruising.  Home care    Keep the hurt area raised to reduce pain and swelling. This is especially important during the first 48 hours.    Apply an ice pack over the injured area for 15 to 20 minutes every 3 to 6 hours. You should do this for the first 24 to 48 hours. You can make an ice pack by filling a plastic bag that seals at the top with ice cubes and then wrapping it with a thin towel. Be careful not to injure your skin with the ice treatments. Ice should never be applied directly to skin. Continue the use of ice packs for relief of pain and swelling as needed. After 48 hours, apply heat (warm shower or warm bath) for 15 to 20 minutes several times a day, or alternate ice and heat.    You may use over-the-counter pain medicine to control pain, unless another medicine was prescribed. If you have chronic liver or kidney disease or ever had a stomach ulcer or GI bleeding, talk with your healthcare provider before using these medicines.    For leg strains: If crutches have been recommended, don t put full weight on the hurt leg until you can do so without pain. You can return to sports when you are able to hop and run on the injured leg without pain.  Follow-up care  Follow up with your healthcare provider, or as advised.  When to seek medical advice  Call your healthcare  provider right away if any of these occur:    The toes of the injured leg become swollen, cold, blue, numb, or tingly    Pain or swelling increases  Date Last Reviewed: 11/19/2015 2000-2017 [employer]. [address] [place] [address], [place] [address]. All rights reserved. This information is not intended as a substitute for professional medical care. Always follow your healthcare professional's instructions.    As we discussed you suffered a muscle strain/sprain from overuse from physical activity.  Please rest, avoid excessive weight or straining, apply ice, massage, and take Tylenol 650 mg every 6 hours as needed for pain.  If pain persists over the next week please come back for reassessment and referral to ortho specialist for further evaluation.    Dipak Ling MD  [facility]             Follow-ups after your visit        Follow-up notes from your care team     Return in about 1 week (around 4/26/2018), or if symptoms worsen or fail to improve, for Routine Visit.      Who to contact     If you have questions or need follow up information about today's clinic visit or your schedule please contact [facility] directly at [phone].  Normal or non-critical lab and imaging results will be communicated to you by MyChart, letter or phone within 4 business days after the clinic has received the results. If you do not hear from us within 7 days, please contact the clinic through [employer] or phone. If you have a critical or abnormal lab result, we will notify you by phone as soon as possible.  Submit refill requests through [employer] or call your pharmacy and they will forward the refill request to us. Please allow 3 business days for your refill to be completed.          Additional Information About Your Visit        [employer] Information     [employer] gives you secure access to your electronic health record. If you see a primary care provider, you can also send messages to your care  "team and make appointments. If you have questions, please call your primary care clinic.  If you do not have a primary care provider, please call [phone] and they will assist you.        Care EveryWhere ID     This is your Care EveryWhere ID. This could be used by other organizations to access your [facility] medical records  [id number]        Your Vitals Were     Pulse Temperature Height BMI (Body Mass Index)          76 97.7  F (36.5  C) (Oral) 5' 1\" (1.549 m) 30.84 kg/m2         Blood Pressure from Last 3 Encounters:   04/19/18 118/84   04/05/18 102/58   04/03/18 106/62    Weight from Last 3 Encounters:   04/19/18 163 lb 3.2 oz (74 kg)   04/05/18 161 lb 3.2 oz (73.1 kg)   04/03/18 161 lb 14.4 oz (73.4 kg)              Today, you had the following     No orders found for display       Primary Care Provider Office Phone # Fax #    VARGAS Wheeler [facility] [phone] [address] [place] [address]        Equal Access to Services     [facility]: Hadii aad ku hadasho Soomaali, waaxda luqadaha, qaybta kaalmada adeegyada, tracy vela . So [facility] [phone].    ATENCIÓN: Si habla español, tiene a dai disposición servicios gratuitos de asistencia lingüística. Ll[facility] [phone].    We comply with applicable federal civil rights laws and [place] laws. We do not discriminate on the basis of race, color, national origin, age, disability, sex, sexual orientation, or gender identity.            Thank you!     Thank you for choosing [facility]  for your care. Our goal is always to provide you with excellent care. Hearing back from our patients is one way we can continue to improve our services. Please take a few minutes to complete the written survey that you may receive in the mail after your visit with us. Thank you!             Your Updated Medication List - Protect others around you: Learn how to safely use, store and throw away " your medicines at www.disposemymeds.org.          This list is accurate as of 4/19/18  4:41 PM.  Always use your most recent med list.                   Brand Name Dispense Instructions for use Diagnosis    prenatal multivitamin plus iron 27-0.8 MG Tabs per tablet     100 tablet    Take 1 tablet by mouth daily    Female infertility

## 2018-04-19 NOTE — PATIENT INSTRUCTIONS
Muscle Strain in the Extremities  A muscle strain is a stretching and tearing of muscle fibers. This causes pain, especially when you move that muscle. There may also be some swelling and bruising.  Home care    Keep the hurt area raised to reduce pain and swelling. This is especially important during the first 48 hours.    Apply an ice pack over the injured area for 15 to 20 minutes every 3 to 6 hours. You should do this for the first 24 to 48 hours. You can make an ice pack by filling a plastic bag that seals at the top with ice cubes and then wrapping it with a thin towel. Be careful not to injure your skin with the ice treatments. Ice should never be applied directly to skin. Continue the use of ice packs for relief of pain and swelling as needed. After 48 hours, apply heat (warm shower or warm bath) for 15 to 20 minutes several times a day, or alternate ice and heat.    You may use over-the-counter pain medicine to control pain, unless another medicine was prescribed. If you have chronic liver or kidney disease or ever had a stomach ulcer or GI bleeding, talk with your healthcare provider before using these medicines.    For leg strains: If crutches have been recommended, don t put full weight on the hurt leg until you can do so without pain. You can return to sports when you are able to hop and run on the injured leg without pain.  Follow-up care  Follow up with your healthcare provider, or as advised.  When to seek medical advice  Call your healthcare provider right away if any of these occur:    The toes of the injured leg become swollen, cold, blue, numb, or tingly    Pain or swelling increases  Date Last Reviewed: 11/19/2015 2000-2017 [employer]. [address] [place] [address]. All rights reserved. This information is not intended as a substitute for professional medical care. Always follow your healthcare professional's instructions.    As we discussed you suffered a muscle  strain/sprain from overuse from physical activity.  Please rest, avoid excessive weight or straining, apply ice, massage, and take Tylenol 650 mg every 6 hours as needed for pain.  If pain persists over the next week please come back for reassessment and referral to ortho specialist for further evaluation.    MD Ena Quiles IM/PEDS

## 2018-04-19 NOTE — PROGRESS NOTES
SUBJECTIVE:   Isaias Thompson is a 33 year old female who presents to clinic today for the following health issues:      Left Quad Pain      Duration: 1 week    Description (location/character/radiation): Lt side    Intensity:  moderate    Accompanying signs and symptoms: Radiated to Lt arm, scapular region    History (similar episodes/previous evaluation): Pt do lots of computer work. Pt stated that she went swimming and not sure if that triggered pain    Precipitating or alleviating factors: None    Therapies tried and outcome: ice ariel  Was effective     She has associated heaviness/dullness in her left arm.  Not exacerbated by movement.  Waxing and waning over the past week.  No clear exacerbation causes other than palpation which worsens the pain.  No prior surgeries or trauma to affected area.  No prior cardiovascular history personally or in the family.  No change in sensation distally however endorses difficulty with strength and grasp when pain as started.  She did notice the pain after swimming 3 days in a row.  No smoking history or alcohol consumption.  No new or OTC medications.      Problem list and histories reviewed & adjusted, as indicated.  Additional history: as documented    Patient Active Problem List   Diagnosis     PCOS (polycystic ovarian syndrome)     Female infertility     Past Surgical History:   Procedure Laterality Date     NO HISTORY OF SURGERY         Social History   Substance Use Topics     Smoking status: Never Smoker     Smokeless tobacco: Never Used     Alcohol use No     Family History   Problem Relation Age of Onset     Other Cancer Mother      ovarian     DIABETES Mother      Hypertension Mother          Current Outpatient Prescriptions   Medication Sig Dispense Refill     Prenatal Vit-Fe Fumarate-FA (PRENATAL MULTIVITAMIN  PLUS IRON) 27-0.8 MG TABS per tablet Take 1 tablet by mouth daily 100 tablet 3     No Known Allergies  Recent Labs   Lab Test  07/27/17   1119   LDL  136*  "  HDL  32*   TRIG  149   TSH  3.12      BP Readings from Last 3 Encounters:   04/19/18 118/84   04/05/18 102/58   04/03/18 106/62    Wt Readings from Last 3 Encounters:   04/19/18 163 lb 3.2 oz (74 kg)   04/05/18 161 lb 3.2 oz (73.1 kg)   04/03/18 161 lb 14.4 oz (73.4 kg)                  Labs reviewed in EPIC    Reviewed and updated as needed this visit by clinical staff  Tobacco  Allergies  Meds  Med Hx  Surg Hx  Fam Hx  Soc Hx      Reviewed and updated as needed this visit by Provider         ROS:  A focused ROS was obtained and documented for notable findings in the HPI.    OBJECTIVE:     /84  Pulse 76  Temp 97.7  F (36.5  C) (Oral)  Ht 5' 1\" (1.549 m)  Wt 163 lb 3.2 oz (74 kg)  BMI 30.84 kg/m2  Body mass index is 30.84 kg/(m^2).  GENERAL: healthy, alert and no distress  RESP: lungs clear to auscultation - no rales, rhonchi or wheezes  CV: regular rate and rhythm, normal S1 S2, no S3 or S4, no murmur, click or rub, no peripheral edema and peripheral pulses strong  MS: tenderness to palpation of left triceps and latissimus dorsi, normal strength and range of motion, provacative testing within normal limits.  SKIN: no suspicious lesions or rashes  NEURO: Normal strength and tone, mentation intact and speech normal    Diagnostic Test Results:  none     ASSESSMENT/PLAN:   1. Sprain, tricep, left, initial encounter  2.  Latissimus dorsi, left, initial encounter  - rest, icing, massage, and PRN Tylenol and NSAID for pain control  - if pain persists, ortho  referral     Follow up in 1-2 weeks for reassessment    The patient was discussed with Dr. Vitaliy Acosta, the attending, who agrees with the plan as stated above.      Dipak Ling MD  [facility]      I have seen this patient and examined him in the presence of Dr. Ling.  I was present during the key components of the presenting complaints, physical exam, diagnosis, and plan, and fully concur with the plan as listed in the " resident's note.    Vitaliy Acosta MD  Internal Medicine and Pediatrics

## 2018-05-07 ENCOUNTER — TRANSFERRED RECORDS (OUTPATIENT)
Dept: HEALTH INFORMATION MANAGEMENT | Facility: CLINIC | Age: 33
End: 2018-05-07

## 2020-03-11 ENCOUNTER — HEALTH MAINTENANCE LETTER (OUTPATIENT)
Age: 35
End: 2020-03-11

## 2020-12-27 ENCOUNTER — HEALTH MAINTENANCE LETTER (OUTPATIENT)
Age: 35
End: 2020-12-27

## 2021-04-25 ENCOUNTER — HEALTH MAINTENANCE LETTER (OUTPATIENT)
Age: 36
End: 2021-04-25

## 2021-10-09 ENCOUNTER — HEALTH MAINTENANCE LETTER (OUTPATIENT)
Age: 36
End: 2021-10-09

## 2022-05-21 ENCOUNTER — HEALTH MAINTENANCE LETTER (OUTPATIENT)
Age: 37
End: 2022-05-21

## 2022-09-17 ENCOUNTER — HEALTH MAINTENANCE LETTER (OUTPATIENT)
Age: 37
End: 2022-09-17

## 2022-11-26 ENCOUNTER — OFFICE VISIT (OUTPATIENT)
Dept: URGENT CARE | Facility: URGENT CARE | Age: 37
End: 2022-11-26
Payer: COMMERCIAL

## 2022-11-26 VITALS
BODY MASS INDEX: 34.77 KG/M2 | SYSTOLIC BLOOD PRESSURE: 133 MMHG | OXYGEN SATURATION: 99 % | WEIGHT: 184 LBS | HEART RATE: 92 BPM | TEMPERATURE: 98.2 F | DIASTOLIC BLOOD PRESSURE: 83 MMHG

## 2022-11-26 DIAGNOSIS — H66.91 RIGHT OTITIS MEDIA, UNSPECIFIED OTITIS MEDIA TYPE: Primary | ICD-10-CM

## 2022-11-26 PROCEDURE — 99203 OFFICE O/P NEW LOW 30 MIN: CPT | Performed by: PHYSICIAN ASSISTANT

## 2022-11-26 RX ORDER — INSULIN GLARGINE 100 [IU]/ML
INJECTION, SOLUTION SUBCUTANEOUS
COMMUNITY
Start: 2022-11-08 | End: 2023-02-13

## 2022-11-26 RX ORDER — CHORIOGONADOTROPIN ALFA 250 UG/.5ML
INJECTION, SOLUTION SUBCUTANEOUS
COMMUNITY
Start: 2022-03-10 | End: 2022-11-26

## 2022-11-26 RX ORDER — IBUPROFEN 200 MG
600 TABLET ORAL ONCE
Status: ACTIVE | OUTPATIENT
Start: 2022-11-26

## 2022-11-26 RX ORDER — INSULIN ASPART 100 [IU]/ML
INJECTION, SOLUTION INTRAVENOUS; SUBCUTANEOUS
COMMUNITY
Start: 2022-10-18 | End: 2023-02-13

## 2022-11-26 RX ORDER — BLOOD-GLUCOSE METER
KIT MISCELLANEOUS
COMMUNITY
Start: 2020-12-31

## 2022-11-26 NOTE — PROGRESS NOTES
SUBJECTIVE:  Isaias Thompson is a 37 year old female right ear drainage presents with a 3-day history of right ear pain that is getting worse.  Has had a cold recently.  Thinks that there might be some drainage from her ear.  Denies any fevers.  Did have a history of ear issues on the left and was treated with Ciprodex.  She is otherwise at baseline health    Past Medical History:   Diagnosis Date     Female infertility 4/3/2018     NO ACTIVE PROBLEMS      PCOS (polycystic ovarian syndrome)      Current Outpatient Medications   Medication     Blood Glucose Monitoring Suppl (ASSURE PRO BLOOD GLUCOSE METER) JOVAN     insulin glargine (BASAGLAR KWIKPEN) 100 UNIT/ML pen     NOVOLOG FLEXPEN 100 UNIT/ML soln     Prenatal Vit-Fe Fumarate-FA (PRENATAL MULTIVITAMIN  PLUS IRON) 27-0.8 MG TABS per tablet     No current facility-administered medications for this visit.     Social History     Socioeconomic History     Marital status:      Spouse name: Not on file     Number of children: Not on file     Years of education: Not on file     Highest education level: Not on file   Occupational History     Not on file   Tobacco Use     Smoking status: Never     Smokeless tobacco: Never   Substance and Sexual Activity     Alcohol use: No     Drug use: No     Sexual activity: Yes     Partners: Male   Other Topics Concern     Parent/sibling w/ CABG, MI or angioplasty before 65F 55M? Not Asked   Social History Narrative     Not on file     Social Determinants of Health     Financial Resource Strain: Not on file   Food Insecurity: Not on file   Transportation Needs: Not on file   Physical Activity: Not on file   Stress: Not on file   Social Connections: Not on file   Intimate Partner Violence: Not on file   Housing Stability: Not on file     ROS  Negative other than stated above    Exam:  GENERAL APPEARANCE: healthy, alert and no distress  EYES: EOMI,  PERRL  HENT: Left TM and canal is clear.  Right TM erythematous with distorted  reflex.  No drainage is noted.  No pain with movement of the canal or palpation of the tragus.  No sinus pain or pressure mucosa moist  NECK: no adenopathy, no asymmetry, masses, or scars and thyroid normal to palpation  RESP: lungs clear to auscultation - no rales, rhonchi or wheezes  CV: regular rates and rhythm, normal S1 S2, no S3 or S4 and no murmur, click or rub -  SKIN: no suspicious lesions or rashes    assessment/plan:  (H66.91) Right otitis media, unspecified otitis media type  (primary encounter diagnosis)  Comment:   Plan: amoxicillin-clavulanate (AUGMENTIN) 875-125 MG         tablet, ibuprofen (ADVIL/MOTRIN) tablet 600 mg        Patient with right-sided ear pain.  Exam does appear to have otitis media.  Augmentin as directed and advised to take with food.  Advised warm packs to the ear for comfort measures along with ibuprofen 600 mg every 6 hours for pain.  He is to follow-up with primary as needed

## 2023-02-13 ENCOUNTER — OFFICE VISIT (OUTPATIENT)
Dept: ENDOCRINOLOGY | Facility: CLINIC | Age: 38
End: 2023-02-13
Payer: COMMERCIAL

## 2023-02-13 ENCOUNTER — TELEPHONE (OUTPATIENT)
Dept: ENDOCRINOLOGY | Facility: CLINIC | Age: 38
End: 2023-02-13

## 2023-02-13 VITALS
SYSTOLIC BLOOD PRESSURE: 118 MMHG | HEIGHT: 61 IN | HEART RATE: 90 BPM | WEIGHT: 179 LBS | TEMPERATURE: 99.3 F | DIASTOLIC BLOOD PRESSURE: 78 MMHG | RESPIRATION RATE: 16 BRPM | BODY MASS INDEX: 33.79 KG/M2 | OXYGEN SATURATION: 96 %

## 2023-02-13 DIAGNOSIS — Z79.4 TYPE 2 DIABETES MELLITUS WITH HYPERGLYCEMIA, WITH LONG-TERM CURRENT USE OF INSULIN (H): Primary | ICD-10-CM

## 2023-02-13 DIAGNOSIS — E11.65 TYPE 2 DIABETES MELLITUS WITH HYPERGLYCEMIA, WITH LONG-TERM CURRENT USE OF INSULIN (H): Primary | ICD-10-CM

## 2023-02-13 PROBLEM — E78.2 MIXED HYPERLIPIDEMIA: Status: ACTIVE | Noted: 2021-01-02

## 2023-02-13 PROBLEM — E11.9 TYPE 2 DIABETES MELLITUS WITHOUT COMPLICATION, WITHOUT LONG-TERM CURRENT USE OF INSULIN (H): Status: ACTIVE | Noted: 2020-12-22

## 2023-02-13 PROCEDURE — 99204 OFFICE O/P NEW MOD 45 MIN: CPT | Performed by: PHYSICIAN ASSISTANT

## 2023-02-13 RX ORDER — FAMOTIDINE 40 MG/1
40 TABLET, FILM COATED ORAL DAILY
Qty: 90 TABLET | Refills: 1 | Status: SHIPPED | OUTPATIENT
Start: 2023-02-13 | End: 2023-03-21

## 2023-02-13 RX ORDER — SIMVASTATIN 20 MG
20 TABLET ORAL AT BEDTIME
COMMUNITY
End: 2023-02-13

## 2023-02-13 RX ORDER — METFORMIN HCL 500 MG
1000 TABLET, EXTENDED RELEASE 24 HR ORAL
COMMUNITY
End: 2023-02-13

## 2023-02-13 RX ORDER — METFORMIN HCL 500 MG
1000 TABLET, EXTENDED RELEASE 24 HR ORAL
Qty: 180 TABLET | Refills: 1 | Status: SHIPPED | OUTPATIENT
Start: 2023-02-13 | End: 2023-03-21

## 2023-02-13 RX ORDER — SIMVASTATIN 20 MG
20 TABLET ORAL AT BEDTIME
Qty: 90 TABLET | Refills: 1 | Status: SHIPPED | OUTPATIENT
Start: 2023-02-13 | End: 2023-03-21

## 2023-02-13 RX ORDER — FAMOTIDINE 40 MG/1
TABLET, FILM COATED ORAL
COMMUNITY
Start: 2023-02-10 | End: 2023-02-13

## 2023-02-13 NOTE — TELEPHONE ENCOUNTER
Will do ANN at next visit.    Miguelina Jain CMA  [facility] [place]  [phone]

## 2023-02-13 NOTE — TELEPHONE ENCOUNTER
[facility] Call Center    Phone Message    May a detailed message be left on voicemail: yes     Reason for Call: Other: [facility] calling in and needs a release of information form faxed over. Fax # is [phone].  They got a call from Miguelina stating they needed the patient's medical notes.     Action Taken: Other: Endo    Travel Screening: Not Applicable

## 2023-02-13 NOTE — LETTER
2/13/2023         RE: Isaias Thompson  [id number]  [facility] [address]        Dear Colleague,    Thank you for referring your patient, Isaias Thompson, to the [facility]. Please see a copy of my visit note below.    Assessment/Plan :   1. Type 2 DM with long term use of insulin. We had a long discussion about insulin resistance and its affect on people with Type 2 DM. We discussed a variety of medication options and I am okay with her restarting Januvia and metformin. I am not sure that these two medications, alone, will get her A1C to goal and I encouraged her to continue to work on diet and exercise.     We also reviewed the value of close glucose monitoring. I would like her to wear the FreeStyle Kelin. A sample was given today and I will send in a new prescription. I think this will be very valuable as she continues to work on getting her blood sugars down.    We also discussed the importance of tight glucose control during pregnancy. She does not want to go back on insulin, at this time. I did explain that if she decides that she wants to try and conceive, in the future, that we will need to restart insulin. She understands and she will keep us updated, if this changes.    2. Hyperlipidemia. We discussed her previous lipid panel and her cholesterol is very elevated. This puts her at an increased risk for heart attack and stroke. She had no problem tolerating the simvastatin and she would like to restart the medication today. I will send in a refill.     We will follow-up in about 1 month.      I have independently reviewed and interpreted labs, imaging as indicated.      Chief complaint:  Isaias is a 38 year old female who comes to our office to establish care for the diagnosis of Type 2 DM with long term use of insulin.    I have reviewed Care Everywhere including [facility], [facility], [facility], [facility], [place], [facility], [facility] , [facility], [place]  lab reports, imaging reports and provider notes as indicated.      HISTORY OF PRESENT ILLNESS  Isaias was diagnosed with Type 2 DM in December of 2020. She had a routine follow-up with her PCP and her A1C had increased to 13.4% from 6.3% the previous year. To note, she did have a confirmed COVID infection in November of 2020. At diagnosis she was started on metformin. In follow-up her A1C had dropped to 9.1% and her PCP added Januvia. Isaias did experience some upset stomach with the metformin but otherwise, she seemed to respond nicely to the oral medications. She was also started on simvastatin for her cholesterol.    Around the same time, she had been considering trying to get pregnant again. She had to undergo fertility treatments with her first child and so she went back to the fertility specialist. They recommended that she see an endocrinologist. They also informed her that she would need to stop Januvia and simvastatin, if she was considering pregnancy. She saw the endocrinologist and he started her on both Lantus and Novolog. He wanted her to get her A1C under 7%, before he would sign off on her restarting fertility treatments. For about a year, she was taking 80 units of Lantus and 54 units of Novolog before each meal. During that time she gained a lot of weight and she didn't feel good. She would often experience large fluctuations in her blood sugars and she was tired all the time.     She got her A1C down to 7.5% and her endocrinologist still refused to sign off on fertility treatments. She was frustrated and decided to postpone trying to conceive. She stopped the insulin completely around August or September of of last year. She focused on diet and exercise. She had a follow-up with her PCP in December and her A1C had only increased to 8.2%. She then restarted the metformin, Januvia, and simvastatin that she was given before. She is hopeful that with the oral medications, she can get her A1C down  and start to feel better.    She has no history of diabetic retinopathy. She has yearly eye exams and her vision has been improving. She also has not history of microalbuminuria or neuropathy.    Endocrine relevant labs are as follows:    HEMOGLOBIN A1C MONITORING (POCT) <=6.4 % 8.2 High         CHOLESTEROL,TOTAL 100 - 199 mg/dL 244 High     TRIGLYCERIDES <150 mg/dL 369 High     HDL CHOLESTEROL >40 mg/dL 33 Low     NON-HDL CHOLESTEROL <145 mg/dl 211 High     CHOL/HDL RATIO            <4.50 7.39 High     LDL CHOLESTEROL <=130 mg/dL 137 High     VLDL CHOLESTEROL <=30 mg/dL 74 High           REVIEW OF SYSTEMS    Endocrine: positive for diabetes, negative for and thyroid disorder  Skin: positive for pigmentation, acanthosis nigricans, negative for, acne on face  Eyes: positive for glasses, negative for, visual blurring  Ears/Nose/Throat: negative  Respiratory: No shortness of breath, dyspnea on exertion, cough, or hemoptysis  Cardiovascular: negative for, chest pain and dyspnea on exertion  Gastrointestinal: negative for, nausea, vomiting, constipation and diarrhea  Genitourinary: negative for, nocturia, dysuria, frequency and urgency  Musculoskeletal: negative for, joint swelling, joint stiffness and nocturnal cramping  Neurologic: negative for and numbness or tingling of feet  Psychiatric: negative  Hematologic/Lymphatic/Immunologic: negative    Past Medical History  Past Medical History:   Diagnosis Date     Female infertility 4/3/2018     NO ACTIVE PROBLEMS      PCOS (polycystic ovarian syndrome)        Medications  Current Outpatient Medications   Medication Sig Dispense Refill     Blood Glucose Monitoring Suppl (ASSURE PRO BLOOD GLUCOSE METER) JOVAN        Continuous Blood Gluc Sensor (FREESTYLE MARYCARMEN 2 SENSOR) MISC 1 each every 14 days 6 each 1     famotidine (PEPCID) 40 MG tablet Take 1 tablet (40 mg) by mouth daily 90 tablet 1     metFORMIN (GLUCOPHAGE XR) 500 MG 24 hr tablet Take 2 tablets (1,000 mg) by mouth  "daily (with dinner) 180 tablet 1     simvastatin (ZOCOR) 20 MG tablet Take 1 tablet (20 mg) by mouth At Bedtime 90 tablet 1     sitagliptin (JANUVIA) 100 MG tablet Take 1 tablet (100 mg) by mouth daily 90 tablet 1       Allergies  Allergies   Allergen Reactions     Doxycycline      Other reaction(s): Edema         Family History  family history includes Diabetes in her mother; Hypertension in her mother; Other Cancer in her mother.    Social History  Social History     Tobacco Use     Smoking status: Never     Smokeless tobacco: Never   Substance Use Topics     Alcohol use: No     Drug use: No       Physical Exam  /78 (BP Location: Left arm, Patient Position: Chair, Cuff Size: Adult Large)   Pulse 90   Temp 99.3  F (37.4  C) (Tympanic)   Resp 16   Ht 1.549 m (5' 0.98\")   Wt 81.2 kg (179 lb)   LMP 01/13/2023   SpO2 96%   Breastfeeding No   BMI 33.84 kg/m    Body mass index is 33.84 kg/m .  GENERAL :  In no apparent distress  SKIN: Normal color, normal temperature, texture.  No hirsutism, alopecia or purple striae.  Acanthosis nigricans at nape of neck   EYES: PERRL, EOMI, No scleral icterus,  No proptosis, conjunctival redness, stare, retraction  NECK: No visible masses. No palpable adenopathy, or masses. No carotid bruits.   THYROID:  Normal, nontender, smooth / firm texture, diffusely enlarged,  no nodules, no Bruit   RESP: Lungs clear to auscultation bilaterally  CARDIAC: Regular rate and rhythm, normal S1 S2, without murmurs, rubs or gallops    NEURO: awake, alert, responds appropriately to questions.  Cranial nerves intact.   Moves all extremities; Gait normal.  No tremor of the outstretched hand.   EXTREMITIES: No clubbing, cyanosis or edema.    DATA REVIEW  She does not currently check her blood sugars.          Again, thank you for allowing me to participate in the care of your patient.        Sincerely,        Ashlyn Leon PA-C    "

## 2023-02-13 NOTE — PROGRESS NOTES
Assessment/Plan :   1. Type 2 DM with long term use of insulin. We had a long discussion about insulin resistance and its affect on people with Type 2 DM. We discussed a variety of medication options and I am okay with her restarting Januvia and metformin. I am not sure that these two medications, alone, will get her A1C to goal and I encouraged her to continue to work on diet and exercise.     We also reviewed the value of close glucose monitoring. I would like her to wear the FreeStyle Kelin. A sample was given today and I will send in a new prescription. I think this will be very valuable as she continues to work on getting her blood sugars down.    We also discussed the importance of tight glucose control during pregnancy. She does not want to go back on insulin, at this time. I did explain that if she decides that she wants to try and conceive, in the future, that we will need to restart insulin. She understands and she will keep us updated, if this changes.    2. Hyperlipidemia. We discussed her previous lipid panel and her cholesterol is very elevated. This puts her at an increased risk for heart attack and stroke. She had no problem tolerating the simvastatin and she would like to restart the medication today. I will send in a refill.     We will follow-up in about 1 month.      I have independently reviewed and interpreted labs, imaging as indicated.      Chief complaint:  Isaias is a 38 year old female who comes to our office to establish care for the diagnosis of Type 2 DM with long term use of insulin.    I have reviewed Care Everywhere including [facility], [facility], [facility], [facility], [facility] , [facility], [place] lab reports, imaging reports and provider notes as indicated.      HISTORY OF PRESENT ILLNESS  Isaias was diagnosed with Type 2 DM in December of 2020. She had a routine follow-up with her PCP and her A1C had increased to 13.4% from 6.3% the previous  year. To note, she did have a confirmed COVID infection in November of 2020. At diagnosis she was started on metformin. In follow-up her A1C had dropped to 9.1% and her PCP added Januvia. Isaias did experience some upset stomach with the metformin but otherwise, she seemed to respond nicely to the oral medications. She was also started on simvastatin for her cholesterol.    Around the same time, she had been considering trying to get pregnant again. She had to undergo fertility treatments with her first child and so she went back to the fertility specialist. They recommended that she see an endocrinologist. They also informed her that she would need to stop Januvia and simvastatin, if she was considering pregnancy. She saw the endocrinologist and he started her on both Lantus and Novolog. He wanted her to get her A1C under 7%, before he would sign off on her restarting fertility treatments. For about a year, she was taking 80 units of Lantus and 54 units of Novolog before each meal. During that time she gained a lot of weight and she didn't feel good. She would often experience large fluctuations in her blood sugars and she was tired all the time.     She got her A1C down to 7.5% and her endocrinologist still refused to sign off on fertility treatments. She was frustrated and decided to postpone trying to conceive. She stopped the insulin completely around August or September of of last year. She focused on diet and exercise. She had a follow-up with her PCP in December and her A1C had only increased to 8.2%. She then restarted the metformin, Januvia, and simvastatin that she was given before. She is hopeful that with the oral medications, she can get her A1C down and start to feel better.    She has no history of diabetic retinopathy. She has yearly eye exams and her vision has been improving. She also has not history of microalbuminuria or neuropathy.    Endocrine relevant labs are as follows:    HEMOGLOBIN A1C  MONITORING (POCT) <=6.4 % 8.2 High         CHOLESTEROL,TOTAL 100 - 199 mg/dL 244 High     TRIGLYCERIDES <150 mg/dL 369 High     HDL CHOLESTEROL >40 mg/dL 33 Low     NON-HDL CHOLESTEROL <145 mg/dl 211 High     CHOL/HDL RATIO            <4.50 7.39 High     LDL CHOLESTEROL <=130 mg/dL 137 High     VLDL CHOLESTEROL <=30 mg/dL 74 High           REVIEW OF SYSTEMS    Endocrine: positive for diabetes, negative for and thyroid disorder  Skin: positive for pigmentation, acanthosis nigricans, negative for, acne on face  Eyes: positive for glasses, negative for, visual blurring  Ears/Nose/Throat: negative  Respiratory: No shortness of breath, dyspnea on exertion, cough, or hemoptysis  Cardiovascular: negative for, chest pain and dyspnea on exertion  Gastrointestinal: negative for, nausea, vomiting, constipation and diarrhea  Genitourinary: negative for, nocturia, dysuria, frequency and urgency  Musculoskeletal: negative for, joint swelling, joint stiffness and nocturnal cramping  Neurologic: negative for and numbness or tingling of feet  Psychiatric: negative  Hematologic/Lymphatic/Immunologic: negative    Past Medical History  Past Medical History:   Diagnosis Date     Female infertility 4/3/2018     NO ACTIVE PROBLEMS      PCOS (polycystic ovarian syndrome)        Medications  Current Outpatient Medications   Medication Sig Dispense Refill     Blood Glucose Monitoring Suppl (ASSURE PRO BLOOD GLUCOSE METER) JOVAN        Continuous Blood Gluc Sensor (FREESTYLE MARYCARMEN 2 SENSOR) MISC 1 each every 14 days 6 each 1     famotidine (PEPCID) 40 MG tablet Take 1 tablet (40 mg) by mouth daily 90 tablet 1     metFORMIN (GLUCOPHAGE XR) 500 MG 24 hr tablet Take 2 tablets (1,000 mg) by mouth daily (with dinner) 180 tablet 1     simvastatin (ZOCOR) 20 MG tablet Take 1 tablet (20 mg) by mouth At Bedtime 90 tablet 1     sitagliptin (JANUVIA) 100 MG tablet Take 1 tablet (100 mg) by mouth daily 90 tablet 1       Allergies  Allergies   Allergen  "Reactions     Doxycycline      Other reaction(s): Edema         Family History  family history includes Diabetes in her mother; Hypertension in her mother; Other Cancer in her mother.    Social History  Social History     Tobacco Use     Smoking status: Never     Smokeless tobacco: Never   Substance Use Topics     Alcohol use: No     Drug use: No       Physical Exam  /78 (BP Location: Left arm, Patient Position: Chair, Cuff Size: Adult Large)   Pulse 90   Temp 99.3  F (37.4  C) (Tympanic)   Resp 16   Ht 1.549 m (5' 0.98\")   Wt 81.2 kg (179 lb)   LMP 01/13/2023   SpO2 96%   Breastfeeding No   BMI 33.84 kg/m    Body mass index is 33.84 kg/m .  GENERAL :  In no apparent distress  SKIN: Normal color, normal temperature, texture.  No hirsutism, alopecia or purple striae.  Acanthosis nigricans at nape of neck   EYES: PERRL, EOMI, No scleral icterus,  No proptosis, conjunctival redness, stare, retraction  NECK: No visible masses. No palpable adenopathy, or masses. No carotid bruits.   THYROID:  Normal, nontender, smooth / firm texture, diffusely enlarged,  no nodules, no Bruit   RESP: Lungs clear to auscultation bilaterally  CARDIAC: Regular rate and rhythm, normal S1 S2, without murmurs, rubs or gallops    NEURO: awake, alert, responds appropriately to questions.  Cranial nerves intact.   Moves all extremities; Gait normal.  No tremor of the outstretched hand.   EXTREMITIES: No clubbing, cyanosis or edema.    DATA REVIEW  She does not currently check her blood sugars.      "

## 2023-03-21 ENCOUNTER — OFFICE VISIT (OUTPATIENT)
Dept: ENDOCRINOLOGY | Facility: CLINIC | Age: 38
End: 2023-03-21
Payer: COMMERCIAL

## 2023-03-21 VITALS
TEMPERATURE: 98.3 F | WEIGHT: 177.5 LBS | HEART RATE: 113 BPM | SYSTOLIC BLOOD PRESSURE: 139 MMHG | RESPIRATION RATE: 16 BRPM | DIASTOLIC BLOOD PRESSURE: 86 MMHG | OXYGEN SATURATION: 98 % | BODY MASS INDEX: 33.51 KG/M2 | HEIGHT: 61 IN

## 2023-03-21 DIAGNOSIS — E11.65 TYPE 2 DIABETES MELLITUS WITH HYPERGLYCEMIA, WITH LONG-TERM CURRENT USE OF INSULIN (H): ICD-10-CM

## 2023-03-21 DIAGNOSIS — E11.65 TYPE 2 DIABETES MELLITUS WITH HYPERGLYCEMIA, WITHOUT LONG-TERM CURRENT USE OF INSULIN (H): Primary | ICD-10-CM

## 2023-03-21 DIAGNOSIS — Z79.4 TYPE 2 DIABETES MELLITUS WITH HYPERGLYCEMIA, WITH LONG-TERM CURRENT USE OF INSULIN (H): ICD-10-CM

## 2023-03-21 LAB
ALBUMIN SERPL BCG-MCNC: 4.4 G/DL (ref 3.5–5.2)
ALP SERPL-CCNC: 118 U/L (ref 35–104)
ALT SERPL W P-5'-P-CCNC: 72 U/L (ref 10–35)
ANION GAP SERPL CALCULATED.3IONS-SCNC: 12 MMOL/L (ref 7–15)
AST SERPL W P-5'-P-CCNC: 60 U/L (ref 10–35)
BILIRUB SERPL-MCNC: 0.2 MG/DL
BUN SERPL-MCNC: 9 MG/DL (ref 6–20)
CALCIUM SERPL-MCNC: 9.7 MG/DL (ref 8.6–10)
CHLORIDE SERPL-SCNC: 99 MMOL/L (ref 98–107)
CHOLEST SERPL-MCNC: 212 MG/DL
CREAT SERPL-MCNC: 0.51 MG/DL (ref 0.51–0.95)
CREAT UR-MCNC: 127 MG/DL
DEPRECATED HCO3 PLAS-SCNC: 23 MMOL/L (ref 22–29)
ERYTHROCYTE [DISTWIDTH] IN BLOOD BY AUTOMATED COUNT: 12.9 % (ref 10–15)
GFR SERPL CREATININE-BSD FRML MDRD: >90 ML/MIN/1.73M2
GLUCOSE SERPL-MCNC: 289 MG/DL (ref 70–99)
HBA1C MFR BLD: 9.4 % (ref 0–5.6)
HCT VFR BLD AUTO: 35.2 % (ref 35–47)
HDLC SERPL-MCNC: 38 MG/DL
HGB BLD-MCNC: 11.6 G/DL (ref 11.7–15.7)
LDLC SERPL CALC-MCNC: 119 MG/DL
MCH RBC QN AUTO: 25.6 PG (ref 26.5–33)
MCHC RBC AUTO-ENTMCNC: 33 G/DL (ref 31.5–36.5)
MCV RBC AUTO: 78 FL (ref 78–100)
MICROALBUMIN UR-MCNC: 22.7 MG/L
MICROALBUMIN/CREAT UR: 17.87 MG/G CR (ref 0–25)
NONHDLC SERPL-MCNC: 174 MG/DL
PLATELET # BLD AUTO: 277 10E3/UL (ref 150–450)
POTASSIUM SERPL-SCNC: 4.1 MMOL/L (ref 3.4–5.3)
PROT SERPL-MCNC: 8.1 G/DL (ref 6.4–8.3)
RBC # BLD AUTO: 4.54 10E6/UL (ref 3.8–5.2)
SODIUM SERPL-SCNC: 134 MMOL/L (ref 136–145)
TRIGL SERPL-MCNC: 273 MG/DL
TSH SERPL DL<=0.005 MIU/L-ACNC: 2.14 UIU/ML (ref 0.3–4.2)
WBC # BLD AUTO: 7.2 10E3/UL (ref 4–11)

## 2023-03-21 PROCEDURE — 36415 COLL VENOUS BLD VENIPUNCTURE: CPT | Performed by: PHYSICIAN ASSISTANT

## 2023-03-21 PROCEDURE — 84443 ASSAY THYROID STIM HORMONE: CPT | Performed by: PHYSICIAN ASSISTANT

## 2023-03-21 PROCEDURE — 82043 UR ALBUMIN QUANTITATIVE: CPT | Performed by: PHYSICIAN ASSISTANT

## 2023-03-21 PROCEDURE — 82570 ASSAY OF URINE CREATININE: CPT | Performed by: PHYSICIAN ASSISTANT

## 2023-03-21 PROCEDURE — 83036 HEMOGLOBIN GLYCOSYLATED A1C: CPT | Performed by: PHYSICIAN ASSISTANT

## 2023-03-21 PROCEDURE — 80061 LIPID PANEL: CPT | Performed by: PHYSICIAN ASSISTANT

## 2023-03-21 PROCEDURE — 99213 OFFICE O/P EST LOW 20 MIN: CPT | Performed by: PHYSICIAN ASSISTANT

## 2023-03-21 PROCEDURE — 85027 COMPLETE CBC AUTOMATED: CPT | Performed by: PHYSICIAN ASSISTANT

## 2023-03-21 PROCEDURE — 80053 COMPREHEN METABOLIC PANEL: CPT | Performed by: PHYSICIAN ASSISTANT

## 2023-03-21 RX ORDER — SIMVASTATIN 20 MG
20 TABLET ORAL AT BEDTIME
Qty: 90 TABLET | Refills: 1 | Status: SHIPPED | OUTPATIENT
Start: 2023-03-21

## 2023-03-21 RX ORDER — FAMOTIDINE 40 MG/1
40 TABLET, FILM COATED ORAL DAILY
Qty: 90 TABLET | Refills: 1 | Status: SHIPPED | OUTPATIENT
Start: 2023-03-21

## 2023-03-21 RX ORDER — METFORMIN HCL 500 MG
1000 TABLET, EXTENDED RELEASE 24 HR ORAL
Qty: 180 TABLET | Refills: 1 | Status: SHIPPED | OUTPATIENT
Start: 2023-03-21

## 2023-03-21 NOTE — PROGRESS NOTES
Assessment/Plan :   1. Type 2 DM, uncontrolled. Isaias feels much better since starting Januvia and metformin. She has not been great about monitoring her blood sugars but she is hopeful that her A1C will remain stable. We had discussed other medication options at the last visit, but I don't want to start anything new, since she is moving to [place]. We will repeat routine laboratory testing today. I also sent in refills of her medications. She needs to establish care with a new provider as soon as possible.    I will contact her regarding the results.      I have independently reviewed and interpreted labs, imaging as indicated.      Chief complaint:  Isaias is a 38 year old female who returns for routine follow-up of uncontrolled Type 2 DM.    I have reviewed Care Everywhere including [facility], [facility], [place],[facility], [facility], [facility] , [facility], [place] lab reports, imaging reports and provider notes as indicated.      HISTORY OF PRESENT ILLNESS  Isaias was diagnosed with Type 2 DM in December of 2020. At the time of diagnosis she was started on metformin and told to work on her diet and exercise. Her blood sugar remained elevated at a follow-up visit, so Januvia was added. She felt like these two medications were working great. However,around the time of diagnosis she had been considering pregnancy. She was told to schedule an appt with an endocrinologist and that her medications would need to be changed, if she wanted to become pregnant. Both the metformin and Januvia were discontinued by the endocrinologist and she was started on MDI insulin therapy. She was able to get her hemoglobin A1C down to 7.5% but she felt horrible. She decided that she was not interested in becoming pregnant, so she stopped the insulin and she stopped seeing the endocrinologist. She focused on her diet and exercise and she recently had a follow-up visit with her primary care provider. Her  hemoglobin A1C had increased to 8.2%.     She first saw me on Feb 13th, a couple months after her routine primary care follow-up. She did not want to go back on insulin and she wanted to restart Januvia and metformin. She was also worried about her cholesterol and she wanted to restart simvastatin. She restarted those medications after our visit. She has not been monitoring her blood sugars but she feels good. She is hopeful that her hemoglobin A1C will be stable.    She is also moving to [place] in 2 wks. She would like refills of her medications and she will establish care with an endocrinologist in [place].     Endocrine relevant labs are as follows:   Ref Range & Units 2 mo ago   HEMOGLOBIN A1C MONITORING (POCT) <=6.4 % 8.2 High           REVIEW OF SYSTEMS    Endocrine: positive for diabetes  Skin: negative  Eyes: negative for visual blurring  Ears/Nose/Throat: negative  Respiratory: No shortness of breath, dyspnea on exertion, cough, or hemoptysis  Cardiovascular: negative for, chest pain and exercise intolerance  Gastrointestinal: negative for, nausea, vomiting, constipation and diarrhea  Genitourinary: negative for, nocturia, dysuria, frequency and urgency  Musculoskeletal: negative for muscular weakness and nocturnal cramping, positive for heel pain  Neurologic: negative for and numbness or tingling of feet  Psychiatric: negative  Hematologic/Lymphatic/Immunologic: negative    Past Medical History  Past Medical History:   Diagnosis Date     Female infertility 4/3/2018     NO ACTIVE PROBLEMS      PCOS (polycystic ovarian syndrome)        Medications  Current Outpatient Medications   Medication Sig Dispense Refill     ASPIRIN NOT PRESCRIBED (INTENTIONAL) Please choose reason not prescribed from choices below.       Blood Glucose Monitoring Suppl (ASSURE PRO BLOOD GLUCOSE METER) JOVAN        Continuous Blood Gluc Sensor (FREESTYLE MARYCARMEN 2 SENSOR) MISC 1 each every 14 days 6 each 1     famotidine (PEPCID) 40 MG  "tablet Take 1 tablet (40 mg) by mouth daily 90 tablet 1     metFORMIN (GLUCOPHAGE XR) 500 MG 24 hr tablet Take 2 tablets (1,000 mg) by mouth daily (with dinner) 180 tablet 1     simvastatin (ZOCOR) 20 MG tablet Take 1 tablet (20 mg) by mouth At Bedtime 90 tablet 1     sitagliptin (JANUVIA) 100 MG tablet Take 1 tablet (100 mg) by mouth daily 90 tablet 0       Allergies  Allergies   Allergen Reactions     Doxycycline      Other reaction(s): Edema         Family History  family history includes Diabetes in her mother; Hypertension in her mother; Other Cancer in her mother.    Social History  Social History     Tobacco Use     Smoking status: Never     Smokeless tobacco: Never   Substance Use Topics     Alcohol use: No     Drug use: No       Physical Exam  /86 (BP Location: Left arm, Patient Position: Chair, Cuff Size: Adult Large)   Pulse 113   Temp 98.3  F (36.8  C) (Oral)   Resp 16   Ht 1.549 m (5' 0.98\")   Wt 80.5 kg (177 lb 8 oz)   LMP 01/13/2023   SpO2 98%   Breastfeeding No   BMI 33.56 kg/m    Body mass index is 33.56 kg/m .  GENERAL :  In no apparent distress  SKIN: Normal color, normal temperature, texture.  No hirsutism, alopecia or purple striae.     EYES: PERRL, EOMI, No scleral icterus,  No proptosis, conjunctival redness, stare, retraction  NECK: No visible masses. No palpable adenopathy, or masses. No carotid bruits.   RESP: Lungs clear to auscultation bilaterally  CARDIAC: Regular rate and rhythm, normal S1 S2, without murmurs, rubs or gallops    NEURO: awake, alert, responds appropriately to questions.  Cranial nerves intact.   Moves all extremities; Gait normal.  No tremor of the outstretched hand.   EXTREMITIES: No clubbing, cyanosis or edema.  FOOT EXAM: Strong pedal pulses bilaterally. Dry skin at heel with cracking. No callus formation at base of toes.    DATA REVIEW    Pt is not currently monitoring her blood sugars    "

## 2023-03-21 NOTE — LETTER
3/21/2023         RE: Isaias Thompson  [address]  [facility] [address]        Dear Colleague,    Thank you for referring your patient, Isaias Thompson, to the [facility]. Please see a copy of my visit note below.    Assessment/Plan :   1. Type 2 DM, uncontrolled. Isaias feels much better since starting Januvia and metformin. She has not been great about monitoring her blood sugars but she is hopeful that her A1C will remain stable. We had discussed other medication options at the last visit, but I don't want to start anything new, since she is moving to [place]. We will repeat routine laboratory testing today. I also sent in refills of her medications. She needs to establish care with a new provider as soon as possible.    I will contact her regarding the results.      I have independently reviewed and interpreted labs, imaging as indicated.      Chief complaint:  Isaias is a 38 year old female who returns for routine follow-up of uncontrolled Type 2 DM.    I have reviewed Care Everywhere including [facility], [facility] [place], [facility], [facility] , [facility], [facility] lab reports, imaging reports and provider notes as indicated.      HISTORY OF PRESENT ILLNESS  Isaias was diagnosed with Type 2 DM in December of 2020. At the time of diagnosis she was started on metformin and told to work on her diet and exercise. Her blood sugar remained elevated at a follow-up visit, so Januvia was added. She felt like these two medications were working great. However,around the time of diagnosis she had been considering pregnancy. She was told to schedule an appt with an endocrinologist and that her medications would need to be changed, if she wanted to become pregnant. Both the metformin and Januvia were discontinued by the endocrinologist and she was started on MDI insulin therapy. She was able to get her hemoglobin A1C down to 7.5% but she felt  horrible. She decided that she was not interested in becoming pregnant, so she stopped the insulin and she stopped seeing the endocrinologist. She focused on her diet and exercise and she recently had a follow-up visit with her primary care provider. Her hemoglobin A1C had increased to 8.2%.     She first saw me on Feb 13th, a couple months after her routine primary care follow-up. She did not want to go back on insulin and she wanted to restart Januvia and metformin. She was also worried about her cholesterol and she wanted to restart simvastatin. She restarted those medications after our visit. She has not been monitoring her blood sugars but she feels good. She is hopeful that her hemoglobin A1C will be stable.    She is also moving to [place] in 2 wks. She would like refills of her medications and she will establish care with an endocrinologist in [place].     Endocrine relevant labs are as follows:   Ref Range & Units 2 mo ago   HEMOGLOBIN A1C MONITORING (POCT) <=6.4 % 8.2 High           REVIEW OF SYSTEMS    Endocrine: positive for diabetes  Skin: negative  Eyes: negative for visual blurring  Ears/Nose/Throat: negative  Respiratory: No shortness of breath, dyspnea on exertion, cough, or hemoptysis  Cardiovascular: negative for, chest pain and exercise intolerance  Gastrointestinal: negative for, nausea, vomiting, constipation and diarrhea  Genitourinary: negative for, nocturia, dysuria, frequency and urgency  Musculoskeletal: negative for muscular weakness and nocturnal cramping, positive for heel pain  Neurologic: negative for and numbness or tingling of feet  Psychiatric: negative  Hematologic/Lymphatic/Immunologic: negative    Past Medical History  Past Medical History:   Diagnosis Date     Female infertility 4/3/2018     NO ACTIVE PROBLEMS      PCOS (polycystic ovarian syndrome)        Medications  Current Outpatient Medications   Medication Sig Dispense Refill     ASPIRIN NOT PRESCRIBED (INTENTIONAL) Please  "choose reason not prescribed from choices below.       Blood Glucose Monitoring Suppl (ASSURE PRO BLOOD GLUCOSE METER) JOVAN        Continuous Blood Gluc Sensor (FREESTYLE MARYCARMEN 2 SENSOR) MISC 1 each every 14 days 6 each 1     famotidine (PEPCID) 40 MG tablet Take 1 tablet (40 mg) by mouth daily 90 tablet 1     metFORMIN (GLUCOPHAGE XR) 500 MG 24 hr tablet Take 2 tablets (1,000 mg) by mouth daily (with dinner) 180 tablet 1     simvastatin (ZOCOR) 20 MG tablet Take 1 tablet (20 mg) by mouth At Bedtime 90 tablet 1     sitagliptin (JANUVIA) 100 MG tablet Take 1 tablet (100 mg) by mouth daily 90 tablet 0       Allergies  Allergies   Allergen Reactions     Doxycycline      Other reaction(s): Edema         Family History  family history includes Diabetes in her mother; Hypertension in her mother; Other Cancer in her mother.    Social History  Social History     Tobacco Use     Smoking status: Never     Smokeless tobacco: Never   Substance Use Topics     Alcohol use: No     Drug use: No       Physical Exam  /86 (BP Location: Left arm, Patient Position: Chair, Cuff Size: Adult Large)   Pulse 113   Temp 98.3  F (36.8  C) (Oral)   Resp 16   Ht 1.549 m (5' 0.98\")   Wt 80.5 kg (177 lb 8 oz)   LMP 01/13/2023   SpO2 98%   Breastfeeding No   BMI 33.56 kg/m    Body mass index is 33.56 kg/m .  GENERAL :  In no apparent distress  SKIN: Normal color, normal temperature, texture.  No hirsutism, alopecia or purple striae.     EYES: PERRL, EOMI, No scleral icterus,  No proptosis, conjunctival redness, stare, retraction  NECK: No visible masses. No palpable adenopathy, or masses. No carotid bruits.   RESP: Lungs clear to auscultation bilaterally  CARDIAC: Regular rate and rhythm, normal S1 S2, without murmurs, rubs or gallops    NEURO: awake, alert, responds appropriately to questions.  Cranial nerves intact.   Moves all extremities; Gait normal.  No tremor of the outstretched hand.   EXTREMITIES: No clubbing, cyanosis or " edema.  FOOT EXAM: Strong pedal pulses bilaterally. Dry skin at heel with cracking. No callus formation at base of toes.    DATA REVIEW    Pt is not currently monitoring her blood sugars        Again, thank you for allowing me to participate in the care of your patient.        Sincerely,        Ashlyn Leon PA-C

## 2023-07-29 ENCOUNTER — HEALTH MAINTENANCE LETTER (OUTPATIENT)
Age: 38
End: 2023-07-29

## 2023-12-16 ENCOUNTER — HEALTH MAINTENANCE LETTER (OUTPATIENT)
Age: 38
End: 2023-12-16

## 2024-02-24 ENCOUNTER — HEALTH MAINTENANCE LETTER (OUTPATIENT)
Age: 39
End: 2024-02-24

## 2024-05-04 ENCOUNTER — HEALTH MAINTENANCE LETTER (OUTPATIENT)
Age: 39
End: 2024-05-04

## 2024-09-21 ENCOUNTER — HEALTH MAINTENANCE LETTER (OUTPATIENT)
Age: 39
End: 2024-09-21

## 2025-01-12 ENCOUNTER — HEALTH MAINTENANCE LETTER (OUTPATIENT)
Age: 40
End: 2025-01-12

## 2025-03-09 ENCOUNTER — HEALTH MAINTENANCE LETTER (OUTPATIENT)
Age: 40
End: 2025-03-09

## 2025-04-26 ENCOUNTER — HEALTH MAINTENANCE LETTER (OUTPATIENT)
Age: 40
End: 2025-04-26

## 2025-08-09 ENCOUNTER — HEALTH MAINTENANCE LETTER (OUTPATIENT)
Age: 40
End: 2025-08-09